# Patient Record
Sex: MALE | Race: ASIAN | Employment: FULL TIME | URBAN - METROPOLITAN AREA
[De-identification: names, ages, dates, MRNs, and addresses within clinical notes are randomized per-mention and may not be internally consistent; named-entity substitution may affect disease eponyms.]

---

## 2019-03-15 ENCOUNTER — OFFICE VISIT (OUTPATIENT)
Dept: PRIMARY CARE CLINIC | Age: 54
End: 2019-03-15
Payer: COMMERCIAL

## 2019-03-15 VITALS
HEART RATE: 73 BPM | BODY MASS INDEX: 23.44 KG/M2 | SYSTOLIC BLOOD PRESSURE: 101 MMHG | OXYGEN SATURATION: 99 % | RESPIRATION RATE: 20 BRPM | HEIGHT: 74 IN | WEIGHT: 182.6 LBS | TEMPERATURE: 97.5 F | DIASTOLIC BLOOD PRESSURE: 65 MMHG

## 2019-03-15 DIAGNOSIS — Z11.4 SCREENING FOR HIV (HUMAN IMMUNODEFICIENCY VIRUS): ICD-10-CM

## 2019-03-15 DIAGNOSIS — Z79.899 MEDICATION MANAGEMENT: ICD-10-CM

## 2019-03-15 DIAGNOSIS — R05.3 COUGH, PERSISTENT: Primary | ICD-10-CM

## 2019-03-15 DIAGNOSIS — Z13.220 SCREENING, LIPID: ICD-10-CM

## 2019-03-15 PROCEDURE — 99203 OFFICE O/P NEW LOW 30 MIN: CPT | Performed by: NURSE PRACTITIONER

## 2019-03-15 RX ORDER — FLUTICASONE PROPIONATE 50 MCG
2 SPRAY, SUSPENSION (ML) NASAL DAILY
Qty: 1 BOTTLE | Refills: 11 | Status: SHIPPED | OUTPATIENT
Start: 2019-03-15

## 2019-03-15 SDOH — HEALTH STABILITY: MENTAL HEALTH: HOW OFTEN DO YOU HAVE A DRINK CONTAINING ALCOHOL?: NEVER

## 2019-03-15 ASSESSMENT — ENCOUNTER SYMPTOMS
NAUSEA: 0
CONSTIPATION: 1
CHEST TIGHTNESS: 0
DIARRHEA: 0
SHORTNESS OF BREATH: 0
COUGH: 0
ABDOMINAL PAIN: 0

## 2019-03-15 ASSESSMENT — PATIENT HEALTH QUESTIONNAIRE - PHQ9
2. FEELING DOWN, DEPRESSED OR HOPELESS: 0
SUM OF ALL RESPONSES TO PHQ QUESTIONS 1-9: 0
SUM OF ALL RESPONSES TO PHQ9 QUESTIONS 1 & 2: 0
SUM OF ALL RESPONSES TO PHQ QUESTIONS 1-9: 0
1. LITTLE INTEREST OR PLEASURE IN DOING THINGS: 0

## 2019-03-29 ENCOUNTER — HOSPITAL ENCOUNTER (OUTPATIENT)
Age: 54
Discharge: HOME | End: 2019-03-29
Payer: COMMERCIAL

## 2019-03-29 DIAGNOSIS — Z11.4 SCREENING FOR HIV (HUMAN IMMUNODEFICIENCY VIRUS): ICD-10-CM

## 2019-03-29 DIAGNOSIS — Z79.899 MEDICATION MANAGEMENT: ICD-10-CM

## 2019-03-29 DIAGNOSIS — Z13.220 SCREENING, LIPID: ICD-10-CM

## 2019-03-29 LAB
ALBUMIN SERPL-MCNC: 4.3 G/DL (ref 3.5–5.2)
ALBUMIN/GLOBULIN RATIO: 1.7 (ref 1–2.5)
ALP BLD-CCNC: 86 U/L (ref 40–129)
ALT SERPL-CCNC: 17 U/L (ref 5–41)
ANION GAP SERPL CALCULATED.3IONS-SCNC: 13 MMOL/L (ref 9–17)
AST SERPL-CCNC: 19 U/L
BILIRUB SERPL-MCNC: 0.56 MG/DL (ref 0.3–1.2)
BUN BLDV-MCNC: 9 MG/DL (ref 6–20)
BUN/CREAT BLD: ABNORMAL (ref 9–20)
CALCIUM SERPL-MCNC: 9.1 MG/DL (ref 8.6–10.4)
CHLORIDE BLD-SCNC: 104 MMOL/L (ref 98–107)
CHOLESTEROL/HDL RATIO: 3.7
CHOLESTEROL: 169 MG/DL
CO2: 22 MMOL/L (ref 20–31)
CREAT SERPL-MCNC: 0.8 MG/DL (ref 0.7–1.2)
ESTIMATED AVERAGE GLUCOSE: 117 MG/DL
GFR AFRICAN AMERICAN: >60 ML/MIN
GFR NON-AFRICAN AMERICAN: >60 ML/MIN
GFR SERPL CREATININE-BSD FRML MDRD: ABNORMAL ML/MIN/{1.73_M2}
GFR SERPL CREATININE-BSD FRML MDRD: ABNORMAL ML/MIN/{1.73_M2}
GLUCOSE BLD-MCNC: 112 MG/DL (ref 70–99)
HBA1C MFR BLD: 5.7 % (ref 4–6)
HCT VFR BLD CALC: 42.5 % (ref 40.7–50.3)
HDLC SERPL-MCNC: 46 MG/DL
HEMOGLOBIN: 14.1 G/DL (ref 13–17)
HIV AG/AB: NONREACTIVE
LDL CHOLESTEROL: 108 MG/DL (ref 0–130)
MCH RBC QN AUTO: 31 PG (ref 25.2–33.5)
MCHC RBC AUTO-ENTMCNC: 33.2 G/DL (ref 28.4–34.8)
MCV RBC AUTO: 93.4 FL (ref 82.6–102.9)
NRBC AUTOMATED: 0 PER 100 WBC
PDW BLD-RTO: 12.6 % (ref 11.8–14.4)
PLATELET # BLD: 224 K/UL (ref 138–453)
PMV BLD AUTO: 10.4 FL (ref 8.1–13.5)
POTASSIUM SERPL-SCNC: 4 MMOL/L (ref 3.7–5.3)
RBC # BLD: 4.55 M/UL (ref 4.21–5.77)
SODIUM BLD-SCNC: 139 MMOL/L (ref 135–144)
TOTAL PROTEIN: 6.8 G/DL (ref 6.4–8.3)
TRIGL SERPL-MCNC: 76 MG/DL
VLDLC SERPL CALC-MCNC: NORMAL MG/DL (ref 1–30)
WBC # BLD: 6 K/UL (ref 3.5–11.3)

## 2019-03-29 PROCEDURE — 80053 COMPREHEN METABOLIC PANEL: CPT

## 2019-03-29 PROCEDURE — 80061 LIPID PANEL: CPT

## 2019-03-29 PROCEDURE — 85027 COMPLETE CBC AUTOMATED: CPT

## 2019-03-29 PROCEDURE — 87389 HIV-1 AG W/HIV-1&-2 AB AG IA: CPT

## 2019-03-29 PROCEDURE — 83036 HEMOGLOBIN GLYCOSYLATED A1C: CPT

## 2019-03-29 PROCEDURE — 36415 COLL VENOUS BLD VENIPUNCTURE: CPT

## 2019-04-05 ENCOUNTER — OFFICE VISIT (OUTPATIENT)
Dept: PRIMARY CARE CLINIC | Age: 54
End: 2019-04-05
Payer: COMMERCIAL

## 2019-04-05 VITALS
TEMPERATURE: 97.3 F | SYSTOLIC BLOOD PRESSURE: 107 MMHG | BODY MASS INDEX: 23.37 KG/M2 | OXYGEN SATURATION: 97 % | WEIGHT: 182 LBS | DIASTOLIC BLOOD PRESSURE: 73 MMHG | HEART RATE: 84 BPM

## 2019-04-05 DIAGNOSIS — R73.03 PRE-DIABETES: Primary | ICD-10-CM

## 2019-04-05 DIAGNOSIS — R05.3 COUGH, PERSISTENT: ICD-10-CM

## 2019-04-05 PROCEDURE — 99214 OFFICE O/P EST MOD 30 MIN: CPT | Performed by: NURSE PRACTITIONER

## 2019-04-05 RX ORDER — ECHINACEA PURPUREA EXTRACT 125 MG
1 TABLET ORAL PRN
Qty: 1 BOTTLE | Refills: 3 | Status: SHIPPED | OUTPATIENT
Start: 2019-04-05

## 2019-04-05 ASSESSMENT — ENCOUNTER SYMPTOMS
COUGH: 0
SHORTNESS OF BREATH: 0

## 2019-04-05 NOTE — PROGRESS NOTES
[facility]  [address], [address] [facility]  [phone]    Trevor Borja is a 48 y.o. male who presents today for his  medical conditions/complaintsas noted below. Trevor Borja is c/o of Discuss Medications and Discuss Labs  . HPI:     HPI   Per last visit:  Pt is concerned with coughing. usually occurring in the morning. Sometimes during the day. He feels he has swelling in his nose. He is using vicks. This has been occurring for a year. He is bringing up white mucus with coughing. Stayed about the same since it started. He has used cough syurp and this helps a little. He has some chest discomfort from coughing. No swelling in legs. He does feel like he has some pnd and rhinorrhea at times. Pt was prescribed flonase. His cough is better. He is happy with how the med has helped him. The 10-year ASCVD risk score (Simon Fam, et al., 2013) is: 2.7%    Values used to calculate the score:      Age: 48 years      Sex: Male      Is Non- : No      Diabetic: No      Tobacco smoker: No      Systolic Blood Pressure: 013 mmHg      Is BP treated: No      HDL Cholesterol: 46 mg/dL      Total Cholesterol: 169 mg/dL    Discussed pre diabetes- pt does eat a lot of bread and rice. Will cut back on these and sugars. Nursing note reviewedand discussed with patient. Patient'smedications, allergies, past medical, surgical, social and family histories werereviewed and updated as appropriate. Current Outpatient Medications on File Prior to Visit   Medication Sig Dispense Refill    fluticasone (FLONASE) 50 MCG/ACT nasal spray 2 sprays by Nasal route daily 1 Bottle 11     No current facility-administered medications on file prior to visit. No past medical history on file.    Past Surgical History:   Procedure Laterality Date    HAND SURGERY      30 years ago     Family History   Problem Relation Age of Onset    High Blood Pressure Paternal Grandmother     High Blood Pressure Mother     Diabetes Mother     Thyroid Disease Father      Social History     Tobacco Use    Smoking status: Never Smoker    Smokeless tobacco: Never Used   Substance Use Topics    Alcohol use: Never     Frequency: Never     Binge frequency: Never      No Known Allergies    Subjective:     Review of Systems   Constitutional: Negative for chills and fever. Respiratory: Negative for cough and shortness of breath. Cardiovascular: Negative for chest pain and leg swelling. Objective:     /73 (Site: Right Upper Arm, Position: Sitting, Cuff Size: Large Adult)   Pulse 84   Temp 97.3 °F (36.3 °C) (Tympanic)   Wt 182 lb (82.6 kg)   SpO2 97%   BMI 23.37 kg/m²    Physical Exam   Constitutional: He is oriented to person, place, and time. He appears well-developed and well-nourished. No distress. HENT:   Head: Normocephalic. Right Ear: External ear normal.   Left Ear: External ear normal.   Eyes: EOM are normal.   Cardiovascular: Normal rate, regular rhythm and normal heart sounds. Pulmonary/Chest: Effort normal and breath sounds normal.   Musculoskeletal: Normal range of motion. Neurological: He is alert and oriented to person, place, and time. Skin: Skin is warm and dry. Psychiatric: He has a normal mood and affect.      Lab Review   Hospital Outpatient Visit on 03/29/2019   Component Date Value    Cholesterol 03/29/2019 169     HDL 03/29/2019 46     LDL Cholesterol 03/29/2019 108     Chol/HDL Ratio 03/29/2019 3.7     Triglycerides 03/29/2019 76     VLDL 03/29/2019 NOT REPORTED     Glucose 03/29/2019 112*    BUN 03/29/2019 9     CREATININE 03/29/2019 0.80     Bun/Cre Ratio 03/29/2019 NOT REPORTED     Calcium 03/29/2019 9.1     Sodium 03/29/2019 139     Potassium 03/29/2019 4.0     Chloride 03/29/2019 104     CO2 03/29/2019 22     Anion Gap 03/29/2019 13     Alkaline Phosphatase 03/29/2019 86     ALT 03/29/2019 17  AST 2019 19     Total Bilirubin 2019 0.56     Total Protein 2019 6.8     Alb 2019 4.3     Albumin/Globulin Ratio 2019 1.7     GFR Non- 2019 >60     GFR  2019 >60     GFR Comment 2019          GFR Staging 2019 NOT REPORTED     Hemoglobin A1C 2019 5.7     Estimated Avg Glucose 2019 117     HIV Ag/Ab 2019 NONREACTIVE     WBC 2019 6.0     RBC 2019 4.55     Hemoglobin 2019 14.1     Hematocrit 2019 42.5     MCV 2019 93.4     MCH 2019 31.0     MCHC 2019 33.2     RDW 2019 12.6     Platelets  224     MPV 2019 10.4     NRBC Automated 2019 0.0          Assessment/Plan         1. Pre-diabetes  Will make changes to diet and exercise. rto in 6 months for f/u    2. Cough, persistent  Improved. RTO if symptoms worsen or fail to improve  Pt agreeable with plan      Patient given educationalmaterials - see patient instructions. Discussed use, benefit, and side effectsof prescribed medications. All patient questions answered. Pt voiced understanding. Reviewed health maintenance. Instructed to continue current medications, diet andexercise. 1.  Gage received counseling on the following healthy behaviors: nutrition, exercise and medication adherence  2. Patient given educational materials when available - see patient instructionswhen applicable  3. Discussed use, benefit, and side effects of prescribed medications. Barriersto medication compliance addressed. All patient questions answered. Pt voicedunderstanding. 4.  Reviewed prior labs and health maintenance  5. Continuecurrent medications, diet and exercise.     Completed Refills   Requested Prescriptions     Signed Prescriptions Disp Refills    sodium chloride (ALTAMIST SPRAY) 0.65 % nasal spray 1 Bottle 3     Si spray by Nasal route as needed for Congestion Electronically signed by Matilde Ward CNP on 4/5/2019 at 12:58 PM

## 2019-04-05 NOTE — PROGRESS NOTES
Visit Information    Have you changed or started any medications since your last visit including any over-the-counter medicines, vitamins, or herbal medicines? yes - multi vit   Are you having any side effects from any of your medications? -  no  Have you stopped taking any of your medications? Is so, why? -  no    Have you seen any other physician or provider since your last visit? No  Have you had any other diagnostic tests since your last visit? No  Have you been seen in the emergency room and/or had an admission to a hospital since we last saw you? No  Have you had your routine dental cleaning in the past 6 months? no    Have you activated your [employer] account? If not, what are your barriers?  Yes pending      Patient Care Team:  FER Hess - CNP as PCP - General (Family Nurse Practitioner)    Medical History Review  Past Medical, Family, and Social History reviewed and does contribute to the patient presenting condition    Health Maintenance   Topic Date Due    Colon cancer screen colonoscopy  02/07/2017    DTaP/Tdap/Td vaccine (1 - Tdap) 03/15/2020 (Originally 2/7/1984)    Flu vaccine (Season Ended) 03/15/2020 (Originally 9/1/2019)    Shingles Vaccine (1 of 2) 03/15/2020 (Originally 2/7/2017)    A1C test (Diabetic or Prediabetic)  03/29/2020    Lipid screen  03/29/2024    HIV screen  Completed

## 2020-12-18 ENCOUNTER — TELEPHONE (OUTPATIENT)
Dept: PRIMARY CARE CLINIC | Age: 55
End: 2020-12-18